# Patient Record
Sex: FEMALE | Race: BLACK OR AFRICAN AMERICAN | NOT HISPANIC OR LATINO | Employment: UNEMPLOYED | URBAN - NONMETROPOLITAN AREA
[De-identification: names, ages, dates, MRNs, and addresses within clinical notes are randomized per-mention and may not be internally consistent; named-entity substitution may affect disease eponyms.]

---

## 2024-06-25 ENCOUNTER — HOSPITAL ENCOUNTER (EMERGENCY)
Facility: HOSPITAL | Age: 24
Discharge: HOME | End: 2024-06-25
Payer: MEDICAID

## 2024-06-25 VITALS
TEMPERATURE: 98 F | DIASTOLIC BLOOD PRESSURE: 89 MMHG | HEART RATE: 82 BPM | OXYGEN SATURATION: 99 % | RESPIRATION RATE: 18 BRPM | WEIGHT: 238 LBS | HEIGHT: 68 IN | BODY MASS INDEX: 36.07 KG/M2 | SYSTOLIC BLOOD PRESSURE: 142 MMHG

## 2024-06-25 DIAGNOSIS — O20.0 THREATENED MISCARRIAGE IN EARLY PREGNANCY: Primary | ICD-10-CM

## 2024-06-25 DIAGNOSIS — N93.9 VAGINAL BLEEDING: ICD-10-CM

## 2024-06-25 LAB — HCG UR QL IA.RAPID: POSITIVE

## 2024-06-25 PROCEDURE — 81025 URINE PREGNANCY TEST: CPT | Performed by: NURSE PRACTITIONER

## 2024-06-25 PROCEDURE — 99282 EMERGENCY DEPT VISIT SF MDM: CPT

## 2024-06-25 NOTE — ED PROVIDER NOTES
Encounter Date: 6/25/2024       History     Chief Complaint   Patient presents with    Vaginal Bleeding    Threatened Miscarriage     Began having vaginal bleeding Saturday, got worse today, is 6 weeks gestation, states is have cramping and passing a lot of clots    The history is provided by the patient. No  was used.     Review of patient's allergies indicates:  No Known Allergies  History reviewed. No pertinent past medical history.  Past Surgical History:   Procedure Laterality Date    ABDOMINAL SURGERY      CHOLECYSTECTOMY       No family history on file.  Social History     Tobacco Use    Smoking status: Never    Smokeless tobacco: Never   Substance Use Topics    Alcohol use: Never    Drug use: Never     Review of Systems   Gastrointestinal:  Positive for abdominal pain.   Genitourinary:  Positive for vaginal bleeding.        Has vaginal bleeding with cramping, is 6 weeks pregnant   All other systems reviewed and are negative.      Physical Exam     Initial Vitals [06/25/24 1415]   BP Pulse Resp Temp SpO2   (!) 142/89 82 18 98.2 °F (36.8 °C) 99 %      MAP       --         Physical Exam    Constitutional: She appears well-developed and well-nourished.   HENT:   Head: Normocephalic and atraumatic.   Right Ear: External ear normal.   Left Ear: External ear normal.   Nose: Nose normal.   Mouth/Throat: Oropharynx is clear and moist.   Eyes: Conjunctivae are normal. Pupils are equal, round, and reactive to light.   Neck: Neck supple.   Normal range of motion.  Cardiovascular:  Normal rate, regular rhythm, normal heart sounds and intact distal pulses.           Pulmonary/Chest: Breath sounds normal.   Abdominal: Abdomen is soft. Bowel sounds are normal.   Musculoskeletal:         General: Normal range of motion.      Cervical back: Normal range of motion and neck supple.     Neurological: She is alert and oriented to person, place, and time. She has normal reflexes.   Skin: Skin is warm and dry.  Capillary refill takes less than 2 seconds.         Medical Screening Exam   See Full Note    ED Course   Procedures  Labs Reviewed   HCG QUALITATIVE URINE - Abnormal; Notable for the following components:       Result Value    HCG Qualitative, Urine Positive (*)     All other components within normal limits          Imaging Results    None          Medications - No data to display  Medical Decision Making             ED Course as of 06/25/24 1512   Tue Jun 25, 2024   2101 Results reviewed and discussed with patient, also discussed need to see OB/GYN due to probable impending spontaneous AB [BP]      ED Course User Index  [BP] Emmanuel Castle NP                           Clinical Impression:   Final diagnoses:  [O20.0] Threatened miscarriage in early pregnancy (Primary)  [N93.9] Vaginal bleeding        ED Disposition Condition    Discharge Stable          ED Prescriptions    None       Follow-up Information       Follow up With Specialties Details Why Contact Info    OB/GYN  Today  Call to get appointment as soon as possible for further evaluation and treatment             Emmanuel Castle NP  06/25/24 3373

## 2024-06-25 NOTE — ED TRIAGE NOTES
PT ARRIVED TO ER WITH C/O BEING 6WKS PREGNANT AND STATES SHE STARTED SPOTTING ON SATURDAY 06/22 AND NOW HAVING SEVERE ABD PAIN AND PASSING CLOTS.